# Patient Record
Sex: MALE
[De-identification: names, ages, dates, MRNs, and addresses within clinical notes are randomized per-mention and may not be internally consistent; named-entity substitution may affect disease eponyms.]

---

## 2023-04-12 PROBLEM — Z00.00 ENCOUNTER FOR PREVENTIVE HEALTH EXAMINATION: Status: ACTIVE | Noted: 2023-04-12

## 2023-04-13 ENCOUNTER — RESULT CHARGE (OUTPATIENT)
Age: 53
End: 2023-04-13

## 2023-04-13 ENCOUNTER — APPOINTMENT (OUTPATIENT)
Dept: ORTHOPEDIC SURGERY | Facility: CLINIC | Age: 53
End: 2023-04-13
Payer: COMMERCIAL

## 2023-04-13 DIAGNOSIS — S92.355A NONDISPLACED FRACTURE OF FIFTH METATARSAL BONE, LEFT FOOT, INITIAL ENCOUNTER FOR CLOSED FRACTURE: ICD-10-CM

## 2023-04-13 PROCEDURE — 28470 CLTX METATARSAL FX WO MNP EA: CPT | Mod: LT

## 2023-04-13 PROCEDURE — 73630 X-RAY EXAM OF FOOT: CPT | Mod: LT

## 2023-04-13 PROCEDURE — 99204 OFFICE O/P NEW MOD 45 MIN: CPT | Mod: 57

## 2023-04-13 NOTE — IMAGING
[de-identified] : He is alert oriented x3.  Pleasant cooperative that exam.  I examined his left lower extremity.  He is tender palpation at the base of the fifth metatarsal and in the surrounding areas.  There is ecchymoses.  The skin is intact.  Nontender elsewhere.  Full range of motion.  No malalignment.  No gross deformity.  Neurovascular intact distally.

## 2023-04-13 NOTE — DATA REVIEWED
[FreeTextEntry1] : Views of the patient's left foot ordered reviewed by me personally.  The x-rays demonstrate the presence of a nondisplaced zone 1 base of the fifth metatarsal fracture.

## 2023-04-13 NOTE — DISCUSSION/SUMMARY
[de-identified] : Patient with left foot base of the fifth metatarsal fracture.  This can be treated nonsurgically.  We will initiate fracture care.  He will be bearing as tolerated in short cam boot.  He can sleep without the boot.  He will follow-up in 4 weeks with our foot and ankle physician assistant.  At that time if the x-rays look acceptable he can discontinue the boot and resume activity as tolerated.  All questions answered

## 2023-04-13 NOTE — HISTORY OF PRESENT ILLNESS
[de-identified] : This is a pleasant 52-year-old patient who presents with a left foot injury.  He sustained his injury 1 week ago when twisting his foot.  He was seen at a local urgent care where they diagnosed him with a fracture.  They placed him into a splint but it was bothering him so he removed it.  His pain is well controlled.  Localized the pain to the lateral aspect of the left foot alone.  Denies any calf pain chest pain shortness of breath.

## 2023-05-11 ENCOUNTER — APPOINTMENT (OUTPATIENT)
Dept: ORTHOPEDIC SURGERY | Facility: CLINIC | Age: 53
End: 2023-05-11

## 2023-05-27 ENCOUNTER — NON-APPOINTMENT (OUTPATIENT)
Age: 53
End: 2023-05-27